# Patient Record
Sex: MALE | Race: ASIAN | NOT HISPANIC OR LATINO | Employment: FULL TIME | URBAN - METROPOLITAN AREA
[De-identification: names, ages, dates, MRNs, and addresses within clinical notes are randomized per-mention and may not be internally consistent; named-entity substitution may affect disease eponyms.]

---

## 2018-02-06 ENCOUNTER — OFFICE VISIT (OUTPATIENT)
Dept: FAMILY MEDICINE | Facility: CLINIC | Age: 53
End: 2018-02-06
Payer: COMMERCIAL

## 2018-02-06 VITALS
WEIGHT: 155.3 LBS | HEIGHT: 65 IN | DIASTOLIC BLOOD PRESSURE: 87 MMHG | HEART RATE: 88 BPM | SYSTOLIC BLOOD PRESSURE: 138 MMHG | RESPIRATION RATE: 16 BRPM | TEMPERATURE: 99.6 F | OXYGEN SATURATION: 92 % | BODY MASS INDEX: 25.87 KG/M2

## 2018-02-06 DIAGNOSIS — H66.001 ACUTE SUPPURATIVE OTITIS MEDIA OF RIGHT EAR WITHOUT SPONTANEOUS RUPTURE OF TYMPANIC MEMBRANE, RECURRENCE NOT SPECIFIED: Primary | ICD-10-CM

## 2018-02-06 DIAGNOSIS — R05.9 COUGH: ICD-10-CM

## 2018-02-06 LAB
FLUAV+FLUBV AG SPEC QL: NEGATIVE
FLUAV+FLUBV AG SPEC QL: NEGATIVE
SPECIMEN SOURCE: NORMAL

## 2018-02-06 PROCEDURE — 87804 INFLUENZA ASSAY W/OPTIC: CPT | Performed by: PHYSICIAN ASSISTANT

## 2018-02-06 PROCEDURE — 99213 OFFICE O/P EST LOW 20 MIN: CPT | Performed by: PHYSICIAN ASSISTANT

## 2018-02-06 RX ORDER — AMOXICILLIN 875 MG
875 TABLET ORAL 2 TIMES DAILY
Qty: 20 TABLET | Refills: 0 | Status: SHIPPED | OUTPATIENT
Start: 2018-02-06 | End: 2018-02-16

## 2018-02-06 RX ORDER — IBUPROFEN 800 MG/1
800 TABLET, FILM COATED ORAL EVERY 8 HOURS PRN
Qty: 30 TABLET | Refills: 0 | Status: SHIPPED | OUTPATIENT
Start: 2018-02-06

## 2018-02-06 ASSESSMENT — PAIN SCALES - GENERAL: PAINLEVEL: SEVERE PAIN (7)

## 2018-02-06 NOTE — LETTER
[address] [address]  Phone: [phone]    February 6, 2018        Yashira Oliveira  [address]  [facility] [address]          To whom it may concern:    RE: Yashira Oliveira    Patient was seen and treated today at our clinic and missed work 2/2/18-2/8/18 due to illness     Please contact me for questions or concerns.      Sincerely,        Arelis Friedman PAC

## 2018-02-06 NOTE — PROGRESS NOTES
"  SUBJECTIVE:   Yashira Oliveira is a 52 year old male who presents to clinic today for the following health issues:      Acute Illness   Acute illness concerns: fever - 2/2/18, L ear pain - 2/5/18  Onset: fever - 2/2/18, L ear pain - 2/5/18    Fever: YES    Chills/Sweats: YES    Headache (location?): YES    Sinus Pressure:YES    Conjunctivitis:  no    Ear Pain: YES- L ear    Rhinorrhea: no    Congestion: no    Sore Throat: no     Cough: YES--- has improved    Wheeze: no    Decreased Appetite: YES    Nausea: no    Vomiting: no    Diarrhea:  YES    Dysuria/Freq.: no    Fatigue/Achiness: YES    Sick/Strep Exposure: YES- similar sx's     Therapies Tried and outcome: Mucinex DM - did help          Problem list and histories reviewed & adjusted, as indicated.  Additional history: as documented    Patient Active Problem List   Diagnosis     CARDIOVASCULAR SCREENING; LDL GOAL LESS THAN 160     History reviewed. No pertinent surgical history.    Social History   Substance Use Topics     Smoking status: Never Smoker     Smokeless tobacco: Never Used     Alcohol use No     Family History   Problem Relation Age of Onset     Other Cancer Father          Current Outpatient Prescriptions   Medication Sig Dispense Refill     amoxicillin (AMOXIL) 875 MG tablet Take 1 tablet (875 mg) by mouth 2 times daily for 10 days 20 tablet 0     ibuprofen (ADVIL/MOTRIN) 800 MG tablet Take 1 tablet (800 mg) by mouth every 8 hours as needed for moderate pain 30 tablet 0     No Known Allergies    ROS:  Constitutional, HEENT, cardiovascular, pulmonary, gi and gu systems are negative, except as otherwise noted.    OBJECTIVE:     /87  Pulse 88  Temp 99.6  F (37.6  C) (Oral)  Resp 16  Ht 5' 4.5\" (1.638 m)  Wt 155 lb 4.8 oz (70.4 kg)  SpO2 92%  BMI 26.25 kg/m2  Body mass index is 26.25 kg/(m^2).  GENERAL: healthy, alert and no distress  EYES: Eyes grossly normal to inspection, PERRL and conjunctivae and sclerae normal  HENT: normal " cephalic/atraumatic, right ear: erythematous, bulging membrane and mucopurulent effusion, left ear: clear effusion, nose and mouth without ulcers or lesions, oropharynx clear and oral mucous membranes moist  NECK: no adenopathy, no asymmetry, masses, or scars and thyroid normal to palpation  RESP: lungs clear to auscultation - no rales, rhonchi or wheezes  CV: regular rate and rhythm, normal S1 S2, no S3 or S4, no murmur, click or rub, no peripheral edema and peripheral pulses strong  ABDOMEN: soft, nontender, no hepatosplenomegaly, no masses and bowel sounds normal  MS: no gross musculoskeletal defects noted, no edema    Diagnostic Test Results:  Results for orders placed or performed in visit on 02/06/18   Influenza A/B antigen   Result Value Ref Range    Influenza A/B Agn Specimen Nasal     Influenza A Negative NEG^Negative    Influenza B Negative NEG^Negative         ASSESSMENT/PLAN:         ICD-10-CM    1. Acute suppurative otitis media of right ear without spontaneous rupture of tympanic membrane, recurrence not specified H66.001 amoxicillin (AMOXIL) 875 MG tablet     ibuprofen (ADVIL/MOTRIN) 800 MG tablet   2. Cough R05 Influenza A/B antigen     Amoxicillin 1 tablet twice a day for 10 days  Ibuprofen 800 mg 1 tablet every 8 hours as needed for fever and pain   Continue Mucinex DM for mucous and cough  Follow up in 5 days if not resolved       Nohemi Friedman PA-C  [facility]

## 2018-02-06 NOTE — MR AVS SNAPSHOT
"              After Visit Summary   2/6/2018    Yashira Oliveira    MRN: [id number]           Patient Information     Date Of Birth          1965        Visit Information        Provider Department      2/6/2018 10:00 AM Nohemi Friedman PA-C [facility]        Today's Diagnoses     Cough    -  1    Acute suppurative otitis media of right ear without spontaneous rupture of tympanic membrane, recurrence not specified          Care Instructions    Amoxicillin 1 tablet twice a day for 10 days  Ibuprofen 800 mg 1 tablet every 8 hours as needed for fever and pain   Continue Mucinex DM for mucous and cough  Follow up in 5 days if not resolved   Delma?m Trùng Traci Gi?a [Middle Ear Infection, Adult]  Quý v? b? delma?m trùng traci gi?a (kaylyn?ng cách n?m ramiro màng nh?). Delma?m trùng có th? x?y ra nh? là h ?u qu? c?a m? t c?n c ?m l? nh thông th? ?ng. ?ó là v ì s? sung deepak?t có th? làm ngh?n ?? ?ng thông leeann ( vòi Maame ) (\"Eustachian Tube\") làm cho ch?t d?ch ti?t ra t? leeann traci gi?a. Khi traci gi? a ? ?y ch?t d?ch, vi khu?n có th? phát tri?n và gây nên delma?m trùng. Thu?c u?ng tr? sinh ?? ? c dùng ? ? ?i ?u tr? ch?ng b?nh này, ch? không ph?i là thu?c nh? l? traci. Các tri?u ch? ng th? ?ng b? t ? ?u th? y ? ? h?n leeann 1 ? ?n 2 ngà y ramiro khi ?i ?u tr?.    Ch?m Sóc T?i Nathalie:  1) U?ng h?t thu?c tr? sinh ? ã ?? ?c ch? ? ?nh ngay c? khi quý v? c?m th? y ? ? h?n ramiro vài ngày ? ?u.  2) Quý v? có th? dùng acetaminophen (Tylenol) ho? c ibuprofen (Motrin, Advil) ? ? ki? m soát c?n ?au, tr ? khi ? ã ?? ?c cho dùng m?t lo?i thu?c khác. [L?U Ý: N?u quý v? b? b?nh simon ho?c th?n mãn tính ho?c t?ng b? loét rudi t? (stomach ulcer) ho?c allred?t deepak? t ?? ?ng tiêu hóa (GI bleeding), hãy bàn v?i bác s? c?a quý v? tr? ?c khi dùng các lo?i thu?c này.] (? ?ng cho b?t c? tr? nào d? ?i 18 tu?i b? b?nh kèm juan pablo s?t dùng aspirin. ?i ?u này có th? gây t?n h?i tr?m tr?ng cho simon.)  Ti?p T?c Juan Pablo Dõi  v?i bác s? c?a quý v? ho? c c? s ? này " "leeann lopez tu?n l? n?u t?t c? các tri?u ch?ng không bi?n m?t, ho?c n?u thính l?c không tr? l?i bình th ? ?ng leeann morse m?t tháng.  N?u x?y ra b?t c? ?i?u nào ramiro ?ây hãy L?P T?C ?I?U TR? Y T?:  -- ?au traci t? h?n ho ?c không c?i thi? n ramiro ba ngày ?i ?u tr?  -- M?t ng? ho?c b?i r?i b? t th? ?ng  -- ?au c?, c? b? c?ng ho?c nh? c ? ?u  -- Ch?t d?ch ho?c máu ti?t ra t? ?ng traci (ear canal)  -- S?t trên 100.5  F (38.0  C) ramiro 3 ngày dùng tr? sinh  -- B? co gi?t  Date Last Reviewed: 1/8/2014 2000-2017 [employer]. [address] [place] [address]. All rights reserved. This information is not intended as a substitute for professional medical care. Always follow your healthcare professional's instructions.                Follow-ups after your visit        Who to contact     If you have questions or need follow up information about today's clinic visit or your schedule please contact [facility] directly at [phone].  Normal or non-critical lab and imaging results will be communicated to you by [employer], letter or phone within 4 business days after the clinic has received the results. If you do not hear from us within 7 days, please contact the clinic through [employer] or phone. If you have a critical or abnormal lab result, we will notify you by phone as soon as possible.  Submit refill requests through [facility] or call your pharmacy and they will forward the refill request to us. Please allow 3 business days for your refill to be completed.          Additional Information About Your Visit        [facility] Information     [facility] lets you send messages to your doctor, view your test results, renew your prescriptions, schedule appointments and more. To sign up, go to [email or web address] . Click on \"Log in\" on the left side of the screen, which will take you to the Welcome page. Then click on \"Sign up Now\" on the right side of the page.     You will be asked to enter the " "access code listed below, as well as some personal information. Please follow the directions to create your username and password.     Your access code is: [email or web address]  Expires: 2018 10:44 AM     Your access code will  in 90 days. If you need help or a new code, please call your [place] clinic or [phone].        Care EveryWhere ID     This is your Care EveryWhere ID. This could be used by other organizations to access your [place] medical records  [id number]        Your Vitals Were     Pulse Temperature Respirations Height Pulse Oximetry BMI (Body Mass Index)    88 99.6  F (37.6  C) (Oral) 16 5' 4.5\" (1.638 m) 92% 26.25 kg/m2       Blood Pressure from Last 3 Encounters:   18 138/87   07/31/15 122/90    Weight from Last 3 Encounters:   18 155 lb 4.8 oz (70.4 kg)   07/31/15 159 lb 6.4 oz (72.3 kg)              We Performed the Following     Influenza A/B antigen          Today's Medication Changes          These changes are accurate as of 18 10:45 AM.  If you have any questions, ask your nurse or doctor.               Start taking these medicines.        Dose/Directions    amoxicillin 875 MG tablet   Commonly known as:  AMOXIL   Used for:  Acute suppurative otitis media of right ear without spontaneous rupture of tympanic membrane, recurrence not specified   Started by:  Nohemi Friedman PA-C        Dose:  875 mg   Take 1 tablet (875 mg) by mouth 2 times daily for 10 days   Quantity:  20 tablet   Refills:  0       ibuprofen 800 MG tablet   Commonly known as:  ADVIL/MOTRIN   Used for:  Acute suppurative otitis media of right ear without spontaneous rupture of tympanic membrane, recurrence not specified   Started by:  Nohemi Friedman PA-C        Dose:  800 mg   Take 1 tablet (800 mg) by mouth every 8 hours as needed for moderate pain   Quantity:  30 tablet   Refills:  0            Where to get your medicines      These medications were sent to [facility] - [place], [place] - [address] AT [facility] [address], [facility] [address]     Phone:  [phone]     amoxicillin 875 MG tablet    ibuprofen 800 MG tablet                Primary Care Provider Office Phone # Fax #    [facility] [phone]       [address]  [facility] [address]        Equal Access to Services     WAYNE PEREZ : Hadii aad ku hadasho Soomaali, waaxda luqadaha, qaybta kaalmada adeegyada, waxay idiin hayaan adeeg kharash la'marilyn . So [facility] [phone].    ATENCIÓN: Si habla español, tiene a rowland disposición servicios gratuitos de asistencia lingüística. Llame al [phone].    We comply with applicable federal civil rights laws and [place] laws. We do not discriminate on the basis of race, color, national origin, age, disability, sex, sexual orientation, or gender identity.            Thank you!     Thank you for choosing [facility]  for your care. Our goal is always to provide you with excellent care. Hearing back from our patients is one way we can continue to improve our services. Please take a few minutes to complete the written survey that you may receive in the mail after your visit with us. Thank you!             Your Updated Medication List - Protect others around you: Learn how to safely use, store and throw away your medicines at www.disposemymeds.org.          This list is accurate as of 2/6/18 10:45 AM.  Always use your most recent med list.                   Brand Name Dispense Instructions for use Diagnosis    amoxicillin 875 MG tablet    AMOXIL    20 tablet    Take 1 tablet (875 mg) by mouth 2 times daily for 10 days    Acute suppurative otitis media of right ear without spontaneous rupture of tympanic membrane, recurrence not specified       ibuprofen 800 MG tablet    ADVIL/MOTRIN    30 tablet    Take 1 tablet (800 mg) by mouth every 8 hours as needed for moderate pain     Acute suppurative otitis media of right ear without spontaneous rupture of tympanic membrane, recurrence not specified          Patient

## 2018-02-06 NOTE — PATIENT INSTRUCTIONS
"Amoxicillin 1 tablet twice a day for 10 days  Ibuprofen 800 mg 1 tablet every 8 hours as needed for fever and pain   Continue Mucinex DM for mucous and cough  Follow up in 5 days if not resolved   Delma?m Trùng Traci Gi?a [Middle Ear Infection, Adult]  Quý v? b? delma?m trùng traci gi?a (kaylyn?ng cách n?m ramiro màng nh?). Delma?m trùng có th? x?y ra nh? là h ?u qu? c?a m? t c?n c ?m l? nh thông th? ?ng. ?ó là v ì s? sung deepak?t có th? làm ngh?n ?? ?ng thông leeann ( vòi Maame ) (\"Eustachian Tube\") làm cho ch?t d?ch ti?t ra t? leeann traci gi?a. Khi traci gi? a ? ?y ch?t d?ch, vi khu?n có th? phát tri?n và gây nên delma?m trùng. Thu?c u?ng tr? sinh ?? ? c dùng ? ? ?i ?u tr? ch?ng b?nh này, ch? không ph?i là thu?c nh? l? traci. Các tri?u ch? ng th? ?ng b? t ? ?u th? y ? ? h?n leeann 1 ? ?n 2 ngà y ramiro khi ?i ?u tr?.    Ch?m Sóc T?i Nathalie:  1) U?ng h?t thu?c tr? sinh ? ã ?? ?c ch? ? ?nh ngay c? khi quý v? c?m th? y ? ? h?n ramiro vài ngày ? ?u.  2) Quý v? có th? dùng acetaminophen (Tylenol) ho? c ibuprofen (Motrin, Advil) ? ? ki? m soát c?n ?au, tr ? khi ? ã ?? ?c cho dùng m?t lo?i thu?c khác. [L?U Ý: N?u quý v? b? b?nh simon ho?c th?n mãn tính ho?c t?ng b? loét rudi t? (stomach ulcer) ho?c allred?t deepak? t ?? ?ng tiêu hóa (GI bleeding), hãy bàn v?i bác s? c?a quý v? tr? ?c khi dùng các lo?i thu?c này.] (? ?ng cho b?t c? tr? nào d? ?i 18 tu?i b? b?nh kèm juan pablo s?t dùng aspirin. ?i ?u này có th? gây t?n h?i tr?m tr?ng cho simon.)  Ti?p T?c Juan Pablo Dõi  v?i bác s? c?a quý v? ho? c c? s ? này leeann vòng john tu?n l? n?u t?t c? các tri?u ch?ng không bi?n m?t, ho?c n?u thính l?c không tr? l?i bình th ? ?ng leeann vòng m?t tháng.  N?u x?y ra b?t c? ?i?u nào ramiro ?ây hãy L?P T?C ?I?U TR? Y T?:  -- ?au traci t? h?n ho ?c không c?i thi? n ramiro ba ngày ?i ?u tr?  -- M?t ng? ho?c b?i r?i b? t th? ?ng  -- ?au c?, c? b? c?ng ho?c nh? c ? ?u  -- Ch?t d?ch ho?c máu ti?t ra t? ?ng traci (ear canal)  -- S?t trên 100.5  F (38.0  C) ramiro 3 ngày dùng tr? sinh  -- B? co gi?t  Date Last " Reviewed: 1/8/2014 2000-2017 [employer]. [address] [place] [address], [place] [address]. All rights reserved. This information is not intended as a substitute for professional medical care. Always follow your healthcare professional's instructions.

## 2018-08-14 ENCOUNTER — TELEPHONE (OUTPATIENT)
Dept: FAMILY MEDICINE | Facility: CLINIC | Age: 53
End: 2018-08-14

## 2018-08-14 NOTE — TELEPHONE ENCOUNTER
Panel Management Review      BP Readings from Last 1 Encounters:   02/06/18 138/87      Last Office Visit with this department: Visit date not found    Fail List measure: colonoscopy      Patient is due/failing the following:   COLONOSCOPY    Action needed:   Colonoscopy/fit    Type of outreach:    Sent letter.    Questions for provider review:    None                                                                                                                                    Roselia Rendon MA      Chart routed to  .

## 2018-08-14 NOTE — LETTER
[address] [address]  [phone]  Dept: [phone]      August 14, 2018      Yashira Oliveira  [address]  [facility] [address]        Dear Yashira Oliveira,     At [facility] we care about your health and are committed to providing quality patient care.    Which includes staying current on preventive cancer screenings.  You can increase your chances of finding and treating cancers through regular screenings.      Our records indicate you may be due for the following prevetive screening(s):    Colonoscopy    Colonoscopy is recommended every ten years for everyone age 50 and older. We strongly urge our patient's to consider having a colonoscopy done, which is the best screening test available and only needs to be done every 10 years if normal. If you are unwilling or unable to have a colonoscopy then we recommend the annual stool testing for blood. This test is called a FIT test and it looks for blood in the stool.     To schedule an appointment or discuss this screening further, you may contact us by phone at the [facility] at [phone] or online through the patient [email or web address] @ [email or web address]    If you have had any of the screenings listed above at another facility, please call us so that we may update your chart.      Your partners in health,      Quality Committee at [facility]

## 2021-05-18 ENCOUNTER — IMMUNIZATION (OUTPATIENT)
Dept: LAB | Facility: CLINIC | Age: 56
End: 2021-05-18
Payer: COMMERCIAL

## 2022-11-09 ENCOUNTER — OFFICE VISIT (OUTPATIENT)
Dept: OPTOMETRY | Facility: CLINIC | Age: 57
End: 2022-11-09
Payer: COMMERCIAL

## 2022-11-09 DIAGNOSIS — S05.02XA ABRASION OF LEFT CORNEA, INITIAL ENCOUNTER: Primary | ICD-10-CM

## 2022-11-09 PROCEDURE — 92071 CONTACT LENS FITTING FOR TX: CPT | Performed by: OPTOMETRIST

## 2022-11-09 PROCEDURE — 92002 INTRM OPH EXAM NEW PATIENT: CPT | Performed by: OPTOMETRIST

## 2022-11-09 RX ORDER — MOXIFLOXACIN 5 MG/ML
1 SOLUTION/ DROPS OPHTHALMIC 4 TIMES DAILY
Qty: 3 ML | Refills: 0 | Status: SHIPPED | OUTPATIENT
Start: 2022-11-09 | End: 2022-11-14

## 2022-11-09 ASSESSMENT — SLIT LAMP EXAM - LIDS
COMMENTS: DERMATOCHALASIS
COMMENTS: DERMATOCHALASIS

## 2022-11-09 ASSESSMENT — VISUAL ACUITY
OD_SC: 20/20
METHOD: SNELLEN - LINEAR
OS_SC: 20/50
OS_SC+: -1
OD_SC+: -2

## 2022-11-09 ASSESSMENT — TONOMETRY
OS_IOP_MMHG: 23
IOP_METHOD: TONOPEN
OS_IOP_MMHG: 22
IOP_METHOD: TONOPEN

## 2022-11-09 ASSESSMENT — REFRACTION_CURRENTRX: OS_SPHERE: -0.25

## 2022-11-09 ASSESSMENT — EXTERNAL EXAM - RIGHT EYE: OD_EXAM: NORMAL

## 2022-11-09 ASSESSMENT — CUP TO DISC RATIO
OD_RATIO: 0.25
OS_RATIO: 0.25

## 2022-11-09 ASSESSMENT — EXTERNAL EXAM - LEFT EYE: OS_EXAM: NORMAL

## 2022-11-09 NOTE — PATIENT INSTRUCTIONS
There is a contact lens in your eye to make you more comfortable.  It is ok to sleep in the lens.  Do not remove it.  If it falls out do not reinsert it.  You need to return to have it removed.    Vigamox- 1 drop left eye 4 x day.    Return tomorrow for evaluation.    Abdoulaye Quintana, TRUPTI    The affects of the dilating drops last for 4- 6 hours.  You will be more sensitive to light and vision will be blurry up close.  Do not drive if you do not feel comfortable.  Mydriatic sunglasses were given if needed.      Optometry Providers       Clinic Locations                                 Telephone Number   Dr. Bev Summers/[facility]aud[facility] Figueroa [phone]     [facility] Hours:                [facility] Hours:       [facility] Hours:   [address]   [address] [facility]     [address] [facility]  [place] [address]   [place] [address]    [place] [address]  Phone: [phone]                    Phone: [phone]     Phone: [phone]                      Monday 8:00-6:00                          Monday 8:00-6:00                          Monday 8:00-6:00              Tuesday 8:00-6:00                          Tuesday 8:00-6:00                          Tuesday 8:00-6:00              Wednesday 8:00-6:00                  Wednesday 8:00-6:00                   Wednesday 8:00-6:00      Thursday 8:00-6:00                        Thursday 8:00-6:00                         Thursday 8:00-6:00            Friday 8:00-5:00                              Friday 8:00-5:00                              Friday 8:00-5:00    [facility] Hours:   [address] [facility] [place] [id number] [phone]    Monday 9:00-6:00  Tuesday 9:00-6:00  Wednesday 9:00-6:00  Thursday 9:00-6:00  Friday 9:00-5:00  Please log on to [email or web address] to order your contact lenses.  The link is found on the Eye Care and  Vision Services page.  As always, Thank you for trusting us with your health care needs!

## 2022-11-09 NOTE — LETTER
11/9/2022         RE: Yashira Oliveira  [address]  [facility] [address]        Dear Colleague,    Thank you for referring your patient, Yashira Oliveira, to the [facility]. Please see a copy of my visit note below.    Chief Complaint   Patient presents with     Eye Problem Left Eye       DOI- 11/9/2022- patient wife was swirling her arm and her hand and arm hit left eye    Laterality: left eye   Duration: 2 hours   Associated symptoms: eye pain, redness, photophobia, lid swelling, and tearing   Pain scale: 8/10   Course: stable   Treatments tried: irrigation   Response to treatment: no improvement   Comments: DOI 11-9-2022 patients wife was swirling her arm and her hand hit his eye.           .Medical, surgical and family histories reviewed and updated 11/9/2022.       OBJECTIVE: See Ophthalmology exam    ASSESSMENT:    ICD-10-CM    1. Abrasion of right cornea, initial encounter  S05.01XA UT FIT CONTACT LENS TX OCULAR SURFACE DISEASE     moxifloxacin (VIGAMOX) 0.5 % ophthalmic solution         PLAN:     Patient Instructions   There is a contact lens in your eye to make you more comfortable.  It is ok to sleep in the lens.  Do not remove it.  If it falls out do not reinsert it.  You need to return to have it removed.    Vigamox- 1 drop left eye 4 x day.    Return tomorrow for evaluation.    Abdoulaye Quintana OD           Again, thank you for allowing me to participate in the care of your patient.        Sincerely,        Abdoulaye Quintana OD

## 2022-11-09 NOTE — PROGRESS NOTES
Chief Complaint   Patient presents with     Eye Problem Left Eye       Ipad interpretor was used.    DOI- 11/9/2022- patient wife was swirling her arm and her hand and arm hit left eye    Laterality: left eye   Duration: 2 hours   Associated symptoms: eye pain, redness, photophobia, lid swelling, and tearing   Pain scale: 8/10   Course: stable   Treatments tried: irrigation   Response to treatment: no improvement   Comments: DOI 11-9-2022 patients wife was swirling her arm and her hand hit his eye.           .Medical, surgical and family histories reviewed and updated 11/9/2022.       OBJECTIVE: See Ophthalmology exam    ASSESSMENT:    ICD-10-CM    1. Abrasion of left cornea, initial encounter  S05.02XA          PLAN:     Patient Instructions   There is a contact lens in your eye to make you more comfortable.  It is ok to sleep in the lens.  Do not remove it.  If it falls out do not reinsert it.  You need to return to have it removed.    Vigamox- 1 drop left eye 4 x day.    Return tomorrow for evaluation.    Abdoulaye Quintana, OD

## 2022-11-10 ENCOUNTER — ALLIED HEALTH/NURSE VISIT (OUTPATIENT)
Dept: OPTOMETRY | Facility: CLINIC | Age: 57
End: 2022-11-10
Payer: COMMERCIAL

## 2022-11-10 DIAGNOSIS — S05.02XD ABRASION OF LEFT CORNEA, SUBSEQUENT ENCOUNTER: Primary | ICD-10-CM

## 2022-11-10 PROCEDURE — 99213 OFFICE O/P EST LOW 20 MIN: CPT | Performed by: OPTOMETRIST

## 2022-11-10 ASSESSMENT — REFRACTION_CURRENTRX
OS_SPHERE: -0.50
OS_SPHERE: -0.25
OS_BRAND: J&J ACUVUE OASYS BC 8.4, D 14.0

## 2022-11-10 ASSESSMENT — VISUAL ACUITY
CORRECTION_TYPE: CONTACTS
OS_PH_CC: 20/60
OD_SC: 20/20
OS_CC: 20/200
OD_SC+: -3
METHOD: SNELLEN - LINEAR

## 2022-11-10 ASSESSMENT — SLIT LAMP EXAM - LIDS
COMMENTS: DERMATOCHALASIS
COMMENTS: DERMATOCHALASIS

## 2022-11-10 ASSESSMENT — EXTERNAL EXAM - RIGHT EYE: OD_EXAM: NORMAL

## 2022-11-10 ASSESSMENT — EXTERNAL EXAM - LEFT EYE: OS_EXAM: NORMAL

## 2022-11-10 NOTE — PATIENT INSTRUCTIONS
Continue Vigamox drops- 1 drop left eye 4 x day.    There is a contact lens in your eye to make you more comfortable.  It is ok to sleep in the lens.  Do not remove it.  If it falls out do not reinsert it.  You need to return to have it removed.    Call [phone] if any concerns over the weekend or worsening of signs/symptoms.    Return in 2-3 days for recheck or sooner if needed.    Abdoulaye Quintana, OD

## 2022-11-10 NOTE — PROGRESS NOTES
Red Eye Recheck    Reason:   Chief Complaint   Patient presents with     Corneal Abrasion Follow Up         Eyes feel: same,os eye still watering,still light sensitive     Medications used: vigamox     How often: 2 x yesterday and 2 x today      Fabiola Jones, Optometric Assistant, A.B.O.C.      OBJECTIVE:     See ophthalmology exam      ASSESSMENT:        ICD-10-CM    1. Abrasion of left cornea, subsequent encounter  S05.02XD     Improved             PLAN:       Patient Instructions   Continue Vigamox drops- 1 drop left eye 4 x day.    There is a contact lens in your eye to make you more comfortable.  It is ok to sleep in the lens.  Do not remove it.  If it falls out do not reinsert it.  You need to return to have it removed.    Call [phone] if any concerns over the weekend or worsening of signs/symptoms.    Return in 2-3 days for recheck or sooner if needed.    Abdoulaye Quintana, OD

## 2022-11-15 ENCOUNTER — OFFICE VISIT (OUTPATIENT)
Dept: OPTOMETRY | Facility: CLINIC | Age: 57
End: 2022-11-15
Payer: COMMERCIAL

## 2022-11-15 DIAGNOSIS — S05.02XD ABRASION OF LEFT CORNEA, SUBSEQUENT ENCOUNTER: Primary | ICD-10-CM

## 2022-11-15 PROCEDURE — 99213 OFFICE O/P EST LOW 20 MIN: CPT | Performed by: OPTOMETRIST

## 2022-11-15 ASSESSMENT — EXTERNAL EXAM - LEFT EYE: OS_EXAM: NORMAL

## 2022-11-15 ASSESSMENT — EXTERNAL EXAM - RIGHT EYE: OD_EXAM: NORMAL

## 2022-11-15 ASSESSMENT — VISUAL ACUITY
OS_CC+: -2
OD_SC: 20/20
METHOD: SNELLEN - LINEAR
OD_SC+: -2
OS_CC: 20/40
CORRECTION_TYPE: CONTACTS

## 2022-11-15 ASSESSMENT — SLIT LAMP EXAM - LIDS
COMMENTS: DERMATOCHALASIS
COMMENTS: DERMATOCHALASIS

## 2022-11-15 NOTE — PROGRESS NOTES
Red Eye Recheck    Reason:   Chief Complaint   Patient presents with     Corneal Abrasion Follow Up         Eyes feel: better    Medications used: vigamox    How often: 4 times per day      Fabiola Jones, Optometric Assistant, A.B.O.C.      OBJECTIVE:     See ophthalmology exam      ASSESSMENT:        ICD-10-CM    1. Abrasion of left cornea, subsequent encounter  S05.02XD     resolved             PLAN:       Patient Instructions   Continue Vigamox- 1 drop left eye 4 x day for 2 days and discontinue.    Recommend artificial tears 1 drop left eye 3 x day and gel at night.    Return in 2 weeks for recheck or sooner if needed.    Abdoulaye Quintana, OD

## 2022-11-15 NOTE — LETTER
11/15/2022         RE: Yashira Oliveira  [address]  [place] [address]        Dear Colleague,    Thank you for referring your patient, Yashira Oliveira, to the [facility]. Please see a copy of my visit note below.    Red Eye Recheck    Reason:   Chief Complaint   Patient presents with     Corneal Abrasion Follow Up         Eyes feel: better    Medications used: vigamox    How often: 4 times per day      Fabiola Jones, Optometric Assistant, A.B.O.C.      OBJECTIVE:     See ophthalmology exam      ASSESSMENT:        ICD-10-CM    1. Abrasion of left cornea, subsequent encounter  S05.02XD              PLAN:       Patient Instructions   Continue Vigamox- 1 drop left eye 4 x day for 2 days and discontinue.    Recommend artificial tears 1 drop left eye 3 x day and gel at night.    Return in 2 weeks for recheck or sooner if needed.    Abdoulaye Quintana, OD                 Again, thank you for allowing me to participate in the care of your patient.        Sincerely,        Abdoulaye Quintana, OD

## 2022-11-29 ENCOUNTER — ALLIED HEALTH/NURSE VISIT (OUTPATIENT)
Dept: OPTOMETRY | Facility: CLINIC | Age: 57
End: 2022-11-29
Payer: COMMERCIAL

## 2022-11-29 DIAGNOSIS — S05.02XD ABRASION OF LEFT CORNEA, SUBSEQUENT ENCOUNTER: Primary | ICD-10-CM

## 2022-11-29 DIAGNOSIS — H52.4 PRESBYOPIA: ICD-10-CM

## 2022-11-29 DIAGNOSIS — Z98.890 HX OF LASIK: ICD-10-CM

## 2022-11-29 DIAGNOSIS — H52.223 REGULAR ASTIGMATISM OF BOTH EYES: ICD-10-CM

## 2022-11-29 DIAGNOSIS — H52.03 HYPEROPIA, BILATERAL: ICD-10-CM

## 2022-11-29 PROCEDURE — 99213 OFFICE O/P EST LOW 20 MIN: CPT | Performed by: OPTOMETRIST

## 2022-11-29 ASSESSMENT — KERATOMETRY
OD_K2POWER_DIOPTERS: 45.75
OS_AXISANGLE_DEGREES: 113
OD_AXISANGLE_DEGREES: 010
OD_AXISANGLE2_DEGREES: 100
OS_AXISANGLE2_DEGREES: 023
OD_K1POWER_DIOPTERS: 45.25
OS_K2POWER_DIOPTERS: 47.00
OS_K1POWER_DIOPTERS: 45.75

## 2022-11-29 ASSESSMENT — REFRACTION_MANIFEST
OS_CYLINDER: +1.50
OD_SPHERE: +1.00
OD_CYLINDER: +0.50
OS_SPHERE: -0.25
OS_ADD: +2.00
OS_AXIS: 175
OD_AXIS: 009
OD_ADD: +2.00

## 2022-11-29 ASSESSMENT — SLIT LAMP EXAM - LIDS
COMMENTS: DERMATOCHALASIS
COMMENTS: DERMATOCHALASIS

## 2022-11-29 ASSESSMENT — VISUAL ACUITY
METHOD: SNELLEN - LINEAR
OS_SC: 20/30
OD_SC: 20/30
OS_SC+: -2

## 2022-11-29 ASSESSMENT — CUP TO DISC RATIO
OD_RATIO: 0.2
OS_RATIO: 0.2

## 2022-11-29 ASSESSMENT — EXTERNAL EXAM - RIGHT EYE: OD_EXAM: NORMAL

## 2022-11-29 ASSESSMENT — TONOMETRY
IOP_METHOD: TONOPEN
OS_IOP_MMHG: 18
OD_IOP_MMHG: 18

## 2022-11-29 ASSESSMENT — EXTERNAL EXAM - LEFT EYE: OS_EXAM: NORMAL

## 2022-11-29 NOTE — PATIENT INSTRUCTIONS
Continue artificial tears- 1 drop both eyes 2-4 x daily.  Especially 1 drop before left eye at night.    Eyeglass prescription given.    Recommend annual eye exams.    Abdoulaye Quintana, OD    There is a combination of three treatments which can greatly improve symptoms of dry eyes.     Artificial tears  Heat (eyes closed)  Eyelid and eyelash cleansing (eyes closed)     Use one drop of artificial tears both eyes 4 x daily.  Once in the morning, lunch, dinner and bedtime. Continue to use the drops regardless if your eyes are comfortable or not.  Artificial tears work best as a preventative and not as well after your eyes are starting to bother you.  It may take 4- 6 weeks of using the drops before you notice improvement.  If after that time you are still having problems schedule an appointment for an evaluation and discussion of different treatments such as Restasis or Xiidra.  Dry eyes are a chronic condition and you may have more symptoms at certain times of the year.    Excess tearing can be due to the right tears not working properly or a blockage in the tear drainage system.  You can try using artificial tears 1 drop both eyes 4 x day.  If the excess tearing is bothersome after 4-6 weeks of treatment then we can send you for further testing.  This would entail a referral to our oculoplastic specialist Dr. Joni Skinner at the [facility]-[phone].    Recommended brands are:    Systane Complete  Systane Ultra  Systane Balance  Refresh Advanced Optive  Refresh Relieva  Blink    Recommended brands for contact lens wearers are:    Systane contacts  Refresh contacts  Blink contacts    If you are using drops more than 4 x day or have sensitivities to preservatives I recommend non preserved artificial tears.  These come in 1 use vials.  They can be used every 1-2 hours.  Do not reuse the vials.    Recommended brands are:    Refresh Optive Cecil-3  Systane- preservative free  Refresh-  preservative free  Blink-  preservative free    Gels or ointment can be used at night.    Recommended brands are:    Systane Gel  Refresh Gel  Blink Gel  Genteal Gel    Systane night time (ointment)  Refresh Celluvisc  Refresh PM (ointment)      Visine, Clear Eyes or Murine (drops that get the red out) can irritate the eyes and cause a rebound effect where the eyes become more red and you end up using more drops.  Avoid drops containing tetrahydrozoline, naphazoline, phenylephrine, oxymetazoline.      OTC Lumify is a newer product that gives immediate redness relief without the rebound effect.  Use as needed to take the redness out.    Artificial tears may be used with other drops (such as allergy, glaucoma, antibiotics) around the same time.  Be sure to wait 5 minutes in between drops.    Heat to the eyelids can also improve your symptoms of dry eyes.  Tanesha heat masks can be purchased at [employer] to be used nightly for 10-15 minutes.  Other options are gel masks that can be put in the microwave and purchased at most pharmacies.      Tea Tree Oil eyelid cleansers recommended are Ocusoft Oust foam cleanser to cleanse eyelids/lashes at night and in the am. Other options are Blephadex or Cliradex eyelid wipes.  KEEP EYES CLOSED when using these products.  These can be purchased on amazon.com   A good product for make up remover with tea tree oil is WeLoveEyes.  This can be found at [email or web address] or [employer].    Other good eyelid cleansers have hypochlorous which removes excess bacteria and is safe around the eyes. Products are Avenova, Ocusoft Hypochlor or Heyedrate. Spray solution onto cotton pad, close eyes and gently apply to eyelids and eyelashes using side to side motion.  You can also KEEP EYES CLOSED spray and rub into eyelashes.  You do not need to rinse it off. Use morning and evening. These products can be found on [employer].  You can check with your local pharmacy and see if they can order if for you if they don't have  it.    Other brands of eyelid cleansing wipes are:    Ocusoft wipes  Systane wipes    A great eye make up line is [email or web address]

## 2022-11-29 NOTE — PROGRESS NOTES
Chief Complaint   Patient presents with     Corneal Abrasion Follow Up     Eyes feel: better, good     Medications used: vigamox- stopped taking yesterday      How often:     History of Lasik-  A few years ago- did not last long.    Abdoulaye Quintana, OD on 11/29/2022 at 12:15 PM      See Review Of Systems       Medical, surgical and family histories reviewed and updated 11/29/2022.         OBJECTIVE: See Ophthalmology exam    ASSESSMENT:    ICD-10-CM    1. Abrasion of left cornea, subsequent encounter  S05.02XD     Resolved      2. Hx of LASIK  Z98.890       3. Presbyopia  H52.4       4. Regular astigmatism of both eyes  H52.223       5. Hyperopia, bilateral  H52.03          PLAN:    Patient Instructions   Continue artificial tears- 1 drop both eyes 2-4 x daily.  Especially 1 drop before left eye at night.    Eyeglass prescription given.    Recommend annual eye exams.    Abdoulaye Quintana, OD